# Patient Record
Sex: FEMALE | Race: WHITE | NOT HISPANIC OR LATINO | Employment: OTHER | URBAN - METROPOLITAN AREA
[De-identification: names, ages, dates, MRNs, and addresses within clinical notes are randomized per-mention and may not be internally consistent; named-entity substitution may affect disease eponyms.]

---

## 2021-02-09 ENCOUNTER — CONVERSION ENCOUNTER (OUTPATIENT)
Dept: NEUROLOGY | Facility: CLINIC | Age: 41
End: 2021-02-09

## 2021-03-23 ENCOUNTER — OFFICE VISIT CONVERTED (OUTPATIENT)
Dept: NEUROLOGY | Facility: CLINIC | Age: 41
End: 2021-03-23
Attending: PSYCHIATRY & NEUROLOGY

## 2021-03-23 ENCOUNTER — CONVERSION ENCOUNTER (OUTPATIENT)
Dept: NEUROLOGY | Facility: CLINIC | Age: 41
End: 2021-03-23

## 2021-05-14 VITALS
HEIGHT: 63 IN | HEART RATE: 66 BPM | BODY MASS INDEX: 39.75 KG/M2 | SYSTOLIC BLOOD PRESSURE: 123 MMHG | WEIGHT: 224.37 LBS | DIASTOLIC BLOOD PRESSURE: 83 MMHG

## 2021-05-14 VITALS
HEIGHT: 63 IN | DIASTOLIC BLOOD PRESSURE: 85 MMHG | BODY MASS INDEX: 38.36 KG/M2 | WEIGHT: 216.5 LBS | SYSTOLIC BLOOD PRESSURE: 149 MMHG | HEART RATE: 90 BPM

## 2021-05-14 NOTE — PROGRESS NOTES
Progress Note      Patient Name: Coco Lundberg   Patient ID: [id number]   Sex: Female   YOB: 1980    Referring Provider: Kenneth Pa    Visit Date: March 23, 2021    Provider: Adiel Khalil MD   Location: [facility]   Location Address: [address] [place] [address]  [id number]   Location Phone: [phone]          Chief Complaint     pt here for 6 week f/u       History Of Present Illness  Coco Lundberg is a 40 year old /White female who presents today to [facility] today referred from Kenneth Pa.      40-year-old woman follow-up visit for seizure-like activity.  She is now dating her spells since January.  She is here today with her son.  There is an episode in which her mouth was tight for 2 hours and she went to the emergency room and given muscle relaxers.  She is not had any episodes in the which there is staring spells or loss of consciousness.  She is not driving.  She is being followed at this time by her primary care and been given medications for anxiety and depression.  She is not driving at this time.       Past Medical History  Bipolar 1 disorder; Fibromyalgia; Seizures         Past Surgical History  Hysterectomy         Medication List  Decadron 6 mg oral tablet; gabapentin 300 mg oral capsule; Klonopin 1 mg oral tablet; omeprazole 40 mg oral capsule,delayed release(DR/EC); quetiapine 200 mg oral tablet; Vraylar 4.5 mg oral capsule         Allergy List  NO KNOWN DRUG ALLERGIES         Family Medical History  Cancer, Unspecified         Social History  Tobacco (Current every day)         Review of Systems  · Constitutional  o Denies  o : chills, excessive sweating, fatigue, fever, sycope/passing out, weight gain, weight loss  · Eyes  o Denies  o : changes in vision, blurred vision, double vision  · HENT  o Denies  o : hearing loss, ringing in the ears, ear aches, sore throat, nasal congestion, sinus pain, nose  "bleeds, seasonal allergies  · Cardiovascular  o Denies  o : blood clots, swollen legs, anemia, easy burising or bleeding, transfusions  · Respiratory  o Denies  o : shortness of breath, dry cough, productive cough, pneumonia, COPD  · Gastrointestinal  o Denies  o : dysphagia, reflux  · Genitourinary  o Denies  o : incontinence  · Neurologic  o Admits  o : headache, seizure  o Denies  o : stroke, tremor, loss of balance, falls, dizziness/vertigo, difficulty with sleep, numbness/tingling/paresthesia , difficulty with coordination, difficulty with dexterity, weakness  · Musculoskeletal  o Denies  o : neck stiffness/pain, swollen lymph nodes, muscle aches, joint pain, weakness, spasms, sciatica, pain radiating in arm, pain radiating in leg, low back pain  · Endocrine  o Denies  o : diabetes, thyroid disorder  · Psychiatric  o Admits  o : anxiety, depression      Vitals  Date Time BP Position Site L\R Cuff Size HR RR TEMP (F) WT  HT  BMI kg/m2 BSA m2 O2 Sat FR L/min FiO2 HC       03/23/2021 02:07 /83 Sitting    66 - R   224lbs 6oz 5'  3\" 39.75 2.13             Physical Examination     She is alert, fluent, phasic, follows commands well.           Assessment  · Psychogenic nonepileptic seizure     300.11/F44.5  I discussed with her and her son that she likely has nonepileptic seizures. I discussed with them that I would like for her to get a sleep deprived EEG. She does not want to be placed on antiepileptic medications at this time. I discussed with them that if she continues to have spells that are seizure-like in nature she will be placed on antiepileptic medication such as Depakote. Should she continue to have spells in spite of being on antiepileptic medication she will be referred to an epilepsy clinic. I discussed with her regarding [place] driving laws. I discussed with her that she should not be driving until 3 months have passed that she is not having any recurrent spells. They have no further questions " at this time.    Total time spent with patient and coordinating patient care was 25 minutes.      Plan  · Orders  o EEG (Sleep Deprived) [facility] ([id number]) - 300.11/F44.5 - 03/23/2021  · Medications  o Medications have been Reconciled  o Transition of Care or Provider Policy  · Instructions  o Encouraged to follow-up with Primary Care Provider for preventative care.            Electronically Signed by: Adiel Khalil MD -Author on March 23, 2021 03:07:31 PM

## 2022-09-07 ENCOUNTER — HOSPITAL ENCOUNTER (EMERGENCY)
Facility: HOSPITAL | Age: 42
Discharge: PSYCHIATRIC OR SUBSTANCE USE FACILITY | End: 2022-09-07
Attending: STUDENT IN AN ORGANIZED HEALTH CARE EDUCATION/TRAINING PROGRAM | Admitting: STUDENT IN AN ORGANIZED HEALTH CARE EDUCATION/TRAINING PROGRAM

## 2022-09-07 ENCOUNTER — HOSPITAL ENCOUNTER (INPATIENT)
Facility: HOSPITAL | Age: 42
LOS: 2 days | Discharge: HOME | End: 2022-09-09
Attending: PSYCHIATRY & NEUROLOGY | Admitting: PSYCHIATRY & NEUROLOGY

## 2022-09-07 VITALS
HEART RATE: 105 BPM | TEMPERATURE: 98.1 F | RESPIRATION RATE: 20 BRPM | WEIGHT: 203.48 LBS | DIASTOLIC BLOOD PRESSURE: 88 MMHG | HEIGHT: 63 IN | BODY MASS INDEX: 36.05 KG/M2 | OXYGEN SATURATION: 98 % | SYSTOLIC BLOOD PRESSURE: 143 MMHG

## 2022-09-07 DIAGNOSIS — F31.10 BIPOLAR I DISORDER WITH MANIA: Primary | ICD-10-CM

## 2022-09-07 PROBLEM — F29 PSYCHOSIS: Status: ACTIVE | Noted: 2022-09-07

## 2022-09-07 LAB
ALBUMIN SERPL-MCNC: 3.9 G/DL (ref 3.5–5.2)
ALBUMIN/GLOB SERPL: 1.2 G/DL
ALP SERPL-CCNC: 102 U/L (ref 39–117)
ALT SERPL W P-5'-P-CCNC: 20 U/L (ref 1–33)
AMPHET+METHAMPHET UR QL: POSITIVE
ANION GAP SERPL CALCULATED.3IONS-SCNC: 9.3 MMOL/L (ref 5–15)
APAP SERPL-MCNC: <5 MCG/ML (ref 0–30)
AST SERPL-CCNC: 15 U/L (ref 1–32)
BARBITURATES UR QL SCN: NEGATIVE
BASOPHILS # BLD AUTO: 0.07 10*3/MM3 (ref 0–0.2)
BASOPHILS NFR BLD AUTO: 0.6 % (ref 0–1.5)
BENZODIAZ UR QL SCN: NEGATIVE
BILIRUB SERPL-MCNC: 0.2 MG/DL (ref 0–1.2)
BILIRUB UR QL STRIP: NEGATIVE
BUN SERPL-MCNC: 6 MG/DL (ref 6–20)
BUN/CREAT SERPL: 8.2 (ref 7–25)
CALCIUM SPEC-SCNC: 8.9 MG/DL (ref 8.6–10.5)
CANNABINOIDS SERPL QL: NEGATIVE
CHLORIDE SERPL-SCNC: 103 MMOL/L (ref 98–107)
CLARITY UR: CLEAR
CO2 SERPL-SCNC: 26.7 MMOL/L (ref 22–29)
COCAINE UR QL: NEGATIVE
COLOR UR: YELLOW
CREAT SERPL-MCNC: 0.73 MG/DL (ref 0.57–1)
DEPRECATED RDW RBC AUTO: 48.4 FL (ref 37–54)
EGFRCR SERPLBLD CKD-EPI 2021: 106.8 ML/MIN/1.73
EOSINOPHIL # BLD AUTO: 0.32 10*3/MM3 (ref 0–0.4)
EOSINOPHIL NFR BLD AUTO: 2.7 % (ref 0.3–6.2)
ERYTHROCYTE [DISTWIDTH] IN BLOOD BY AUTOMATED COUNT: 15.7 % (ref 12.3–15.4)
ETHANOL BLD-MCNC: <10 MG/DL (ref 0–10)
ETHANOL UR QL: <0.01 %
GLOBULIN UR ELPH-MCNC: 3.2 GM/DL
GLUCOSE SERPL-MCNC: 107 MG/DL (ref 65–99)
GLUCOSE UR STRIP-MCNC: NEGATIVE MG/DL
HCT VFR BLD AUTO: 39.3 % (ref 34–46.6)
HGB BLD-MCNC: 13.1 G/DL (ref 12–15.9)
HGB UR QL STRIP.AUTO: NEGATIVE
HOLD SPECIMEN: NORMAL
HOLD SPECIMEN: NORMAL
IMM GRANULOCYTES # BLD AUTO: 0.17 10*3/MM3 (ref 0–0.05)
IMM GRANULOCYTES NFR BLD AUTO: 1.5 % (ref 0–0.5)
KETONES UR QL STRIP: NEGATIVE
LEUKOCYTE ESTERASE UR QL STRIP.AUTO: NEGATIVE
LYMPHOCYTES # BLD AUTO: 2.58 10*3/MM3 (ref 0.7–3.1)
LYMPHOCYTES NFR BLD AUTO: 22 % (ref 19.6–45.3)
MCH RBC QN AUTO: 28.5 PG (ref 26.6–33)
MCHC RBC AUTO-ENTMCNC: 33.3 G/DL (ref 31.5–35.7)
MCV RBC AUTO: 85.6 FL (ref 79–97)
METHADONE UR QL SCN: NEGATIVE
MONOCYTES # BLD AUTO: 0.7 10*3/MM3 (ref 0.1–0.9)
MONOCYTES NFR BLD AUTO: 6 % (ref 5–12)
NEUTROPHILS NFR BLD AUTO: 67.2 % (ref 42.7–76)
NEUTROPHILS NFR BLD AUTO: 7.88 10*3/MM3 (ref 1.7–7)
NITRITE UR QL STRIP: NEGATIVE
NRBC BLD AUTO-RTO: 0 /100 WBC (ref 0–0.2)
OPIATES UR QL: POSITIVE
OXYCODONE UR QL SCN: NEGATIVE
PH UR STRIP.AUTO: 7 [PH] (ref 5–8)
PLATELET # BLD AUTO: 380 10*3/MM3 (ref 140–450)
PMV BLD AUTO: 9.3 FL (ref 6–12)
POTASSIUM SERPL-SCNC: 4.2 MMOL/L (ref 3.5–5.2)
PROT SERPL-MCNC: 7.1 G/DL (ref 6–8.5)
PROT UR QL STRIP: NEGATIVE
RBC # BLD AUTO: 4.59 10*6/MM3 (ref 3.77–5.28)
SALICYLATES SERPL-MCNC: <0.3 MG/DL
SARS-COV-2 RNA RESP QL NAA+PROBE: NOT DETECTED
SODIUM SERPL-SCNC: 139 MMOL/L (ref 136–145)
SP GR UR STRIP: 1.01 (ref 1–1.03)
T4 FREE SERPL-MCNC: 0.84 NG/DL (ref 0.93–1.7)
TSH SERPL DL<=0.05 MIU/L-ACNC: 6.01 UIU/ML (ref 0.27–4.2)
UROBILINOGEN UR QL STRIP: NORMAL
WBC NRBC COR # BLD: 11.72 10*3/MM3 (ref 3.4–10.8)
WHOLE BLOOD HOLD COAG: NORMAL
WHOLE BLOOD HOLD SPECIMEN: NORMAL

## 2022-09-07 PROCEDURE — 82077 ASSAY SPEC XCP UR&BREATH IA: CPT | Performed by: STUDENT IN AN ORGANIZED HEALTH CARE EDUCATION/TRAINING PROGRAM

## 2022-09-07 PROCEDURE — 80307 DRUG TEST PRSMV CHEM ANLYZR: CPT | Performed by: STUDENT IN AN ORGANIZED HEALTH CARE EDUCATION/TRAINING PROGRAM

## 2022-09-07 PROCEDURE — 36415 COLL VENOUS BLD VENIPUNCTURE: CPT

## 2022-09-07 PROCEDURE — 80179 DRUG ASSAY SALICYLATE: CPT | Performed by: STUDENT IN AN ORGANIZED HEALTH CARE EDUCATION/TRAINING PROGRAM

## 2022-09-07 PROCEDURE — U0003 INFECTIOUS AGENT DETECTION BY NUCLEIC ACID (DNA OR RNA); SEVERE ACUTE RESPIRATORY SYNDROME CORONAVIRUS 2 (SARS-COV-2) (CORONAVIRUS DISEASE [COVID-19]), AMPLIFIED PROBE TECHNIQUE, MAKING USE OF HIGH THROUGHPUT TECHNOLOGIES AS DESCRIBED BY CMS-2020-01-R: HCPCS | Performed by: STUDENT IN AN ORGANIZED HEALTH CARE EDUCATION/TRAINING PROGRAM

## 2022-09-07 PROCEDURE — 84439 ASSAY OF FREE THYROXINE: CPT | Performed by: STUDENT IN AN ORGANIZED HEALTH CARE EDUCATION/TRAINING PROGRAM

## 2022-09-07 PROCEDURE — C9803 HOPD COVID-19 SPEC COLLECT: HCPCS

## 2022-09-07 PROCEDURE — 81003 URINALYSIS AUTO W/O SCOPE: CPT | Performed by: STUDENT IN AN ORGANIZED HEALTH CARE EDUCATION/TRAINING PROGRAM

## 2022-09-07 PROCEDURE — 80050 GENERAL HEALTH PANEL: CPT | Performed by: STUDENT IN AN ORGANIZED HEALTH CARE EDUCATION/TRAINING PROGRAM

## 2022-09-07 PROCEDURE — 99284 EMERGENCY DEPT VISIT MOD MDM: CPT

## 2022-09-07 PROCEDURE — 80143 DRUG ASSAY ACETAMINOPHEN: CPT | Performed by: STUDENT IN AN ORGANIZED HEALTH CARE EDUCATION/TRAINING PROGRAM

## 2022-09-07 RX ORDER — CLONAZEPAM 1 MG/1
1 TABLET ORAL 2 TIMES DAILY PRN
COMMUNITY
Start: 2022-06-03 | End: 2022-09-09 | Stop reason: HOSPADM

## 2022-09-07 RX ORDER — LOPERAMIDE HYDROCHLORIDE 2 MG/1
2 CAPSULE ORAL
Status: DISCONTINUED | OUTPATIENT
Start: 2022-09-07 | End: 2022-09-09 | Stop reason: HOSPADM

## 2022-09-07 RX ORDER — GABAPENTIN 600 MG/1
600 TABLET ORAL 3 TIMES DAILY
COMMUNITY
Start: 2022-05-24

## 2022-09-07 RX ORDER — DIPHENHYDRAMINE HCL 50 MG
50 CAPSULE ORAL EVERY 4 HOURS PRN
Status: DISCONTINUED | OUTPATIENT
Start: 2022-09-07 | End: 2022-09-09 | Stop reason: HOSPADM

## 2022-09-07 RX ORDER — TRAZODONE HYDROCHLORIDE 50 MG/1
50 TABLET ORAL NIGHTLY PRN
Status: DISCONTINUED | OUTPATIENT
Start: 2022-09-07 | End: 2022-09-09 | Stop reason: HOSPADM

## 2022-09-07 RX ORDER — QUETIAPINE FUMARATE 300 MG/1
300 TABLET, FILM COATED ORAL 2 TIMES DAILY
COMMUNITY
Start: 2022-09-07 | End: 2022-09-09 | Stop reason: HOSPADM

## 2022-09-07 RX ORDER — ALUMINA, MAGNESIA, AND SIMETHICONE 2400; 2400; 240 MG/30ML; MG/30ML; MG/30ML
15 SUSPENSION ORAL EVERY 6 HOURS PRN
Status: DISCONTINUED | OUTPATIENT
Start: 2022-09-07 | End: 2022-09-09 | Stop reason: HOSPADM

## 2022-09-07 RX ORDER — HALOPERIDOL 5 MG/1
5 TABLET ORAL EVERY 4 HOURS PRN
Status: DISCONTINUED | OUTPATIENT
Start: 2022-09-07 | End: 2022-09-09 | Stop reason: HOSPADM

## 2022-09-07 RX ORDER — ACETAMINOPHEN 325 MG/1
650 TABLET ORAL EVERY 6 HOURS PRN
Status: DISCONTINUED | OUTPATIENT
Start: 2022-09-07 | End: 2022-09-09 | Stop reason: HOSPADM

## 2022-09-07 RX ORDER — OMEPRAZOLE 40 MG/1
20 CAPSULE, DELAYED RELEASE ORAL EVERY MORNING
COMMUNITY
Start: 2022-08-10

## 2022-09-07 RX ORDER — FAMOTIDINE 20 MG/1
20 TABLET, FILM COATED ORAL 2 TIMES DAILY PRN
Status: DISCONTINUED | OUTPATIENT
Start: 2022-09-07 | End: 2022-09-09 | Stop reason: HOSPADM

## 2022-09-07 RX ORDER — IBUPROFEN 400 MG/1
400 TABLET ORAL EVERY 6 HOURS PRN
Status: DISCONTINUED | OUTPATIENT
Start: 2022-09-07 | End: 2022-09-09 | Stop reason: HOSPADM

## 2022-09-07 RX ORDER — HALOPERIDOL 5 MG/ML
5 INJECTION INTRAMUSCULAR EVERY 4 HOURS PRN
Status: DISCONTINUED | OUTPATIENT
Start: 2022-09-07 | End: 2022-09-09 | Stop reason: HOSPADM

## 2022-09-07 RX ORDER — ALBUTEROL SULFATE 90 UG/1
2 AEROSOL, METERED RESPIRATORY (INHALATION) EVERY 4 HOURS PRN
COMMUNITY
Start: 2022-05-24

## 2022-09-07 RX ORDER — NICOTINE 21 MG/24HR
1 PATCH, TRANSDERMAL 24 HOURS TRANSDERMAL ONCE
Status: DISCONTINUED | OUTPATIENT
Start: 2022-09-07 | End: 2022-09-07 | Stop reason: HOSPADM

## 2022-09-07 RX ORDER — FLUTICASONE PROPIONATE AND SALMETEROL XINAFOATE 115; 21 UG/1; UG/1
2 AEROSOL, METERED RESPIRATORY (INHALATION) 2 TIMES DAILY
COMMUNITY
Start: 2022-06-21

## 2022-09-07 RX ORDER — HYDROXYZINE HYDROCHLORIDE 25 MG/1
50 TABLET, FILM COATED ORAL EVERY 6 HOURS PRN
Status: DISCONTINUED | OUTPATIENT
Start: 2022-09-07 | End: 2022-09-09 | Stop reason: HOSPADM

## 2022-09-07 RX ORDER — DIPHENHYDRAMINE HYDROCHLORIDE 50 MG/ML
50 INJECTION INTRAMUSCULAR; INTRAVENOUS EVERY 4 HOURS PRN
Status: DISCONTINUED | OUTPATIENT
Start: 2022-09-07 | End: 2022-09-09 | Stop reason: HOSPADM

## 2022-09-07 RX ADMIN — NICOTINE 1 PATCH: 21 PATCH, EXTENDED RELEASE TRANSDERMAL at 22:06

## 2022-09-07 NOTE — ED NOTES
Mother has note written by doctor asking for pt to be admitted for terell and hallucinations. Pt has been off bipolar and schizophrenia medications. Mother has been staying with pt. Pt denies being suicidal to this RN but mother states pt has made suicidal comments.

## 2022-09-08 PROBLEM — M79.7 FIBROMYALGIA: Status: ACTIVE | Noted: 2021-12-08

## 2022-09-08 PROBLEM — F11.10 OPIOID ABUSE: Status: ACTIVE | Noted: 2022-09-08

## 2022-09-08 PROBLEM — F15.90 AMPHETAMINE MISUSE: Status: ACTIVE | Noted: 2022-09-08

## 2022-09-08 PROBLEM — J45.909 ASTHMA: Status: ACTIVE | Noted: 2022-09-08

## 2022-09-08 PROBLEM — F43.10 POSTTRAUMATIC STRESS DISORDER: Status: ACTIVE | Noted: 2021-12-08

## 2022-09-08 PROCEDURE — 94640 AIRWAY INHALATION TREATMENT: CPT

## 2022-09-08 RX ORDER — BUDESONIDE AND FORMOTEROL FUMARATE DIHYDRATE 80; 4.5 UG/1; UG/1
2 AEROSOL RESPIRATORY (INHALATION)
Status: DISCONTINUED | OUTPATIENT
Start: 2022-09-08 | End: 2022-09-09 | Stop reason: HOSPADM

## 2022-09-08 RX ORDER — ARFORMOTEROL TARTRATE 15 UG/2ML
15 SOLUTION RESPIRATORY (INHALATION)
Status: DISCONTINUED | OUTPATIENT
Start: 2022-09-08 | End: 2022-09-08

## 2022-09-08 RX ORDER — RISPERIDONE 2 MG/1
2 TABLET ORAL 2 TIMES DAILY
Status: DISCONTINUED | OUTPATIENT
Start: 2022-09-08 | End: 2022-09-09 | Stop reason: HOSPADM

## 2022-09-08 RX ORDER — NICOTINE 21 MG/24HR
1 PATCH, TRANSDERMAL 24 HOURS TRANSDERMAL
Status: DISCONTINUED | OUTPATIENT
Start: 2022-09-08 | End: 2022-09-09 | Stop reason: HOSPADM

## 2022-09-08 RX ORDER — ALBUTEROL SULFATE 90 UG/1
2 AEROSOL, METERED RESPIRATORY (INHALATION) EVERY 4 HOURS PRN
Status: DISCONTINUED | OUTPATIENT
Start: 2022-09-08 | End: 2022-09-09 | Stop reason: HOSPADM

## 2022-09-08 RX ORDER — BUDESONIDE 0.25 MG/2ML
0.25 INHALANT ORAL
Status: DISCONTINUED | OUTPATIENT
Start: 2022-09-08 | End: 2022-09-08

## 2022-09-08 RX ORDER — GABAPENTIN 400 MG/1
400 CAPSULE ORAL 3 TIMES DAILY
Status: DISCONTINUED | OUTPATIENT
Start: 2022-09-08 | End: 2022-09-09 | Stop reason: HOSPADM

## 2022-09-08 RX ADMIN — GABAPENTIN 400 MG: 400 CAPSULE ORAL at 15:01

## 2022-09-08 RX ADMIN — TRAZODONE HYDROCHLORIDE 50 MG: 50 TABLET ORAL at 21:25

## 2022-09-08 RX ADMIN — RISPERIDONE 2 MG: 2 TABLET ORAL at 21:22

## 2022-09-08 RX ADMIN — GABAPENTIN 400 MG: 400 CAPSULE ORAL at 21:22

## 2022-09-08 RX ADMIN — GABAPENTIN 400 MG: 400 CAPSULE ORAL at 09:40

## 2022-09-08 RX ADMIN — TRAZODONE HYDROCHLORIDE 50 MG: 50 TABLET ORAL at 00:43

## 2022-09-08 RX ADMIN — BUDESONIDE AND FORMOTEROL FUMARATE DIHYDRATE 2 PUFF: 80; 4.5 AEROSOL RESPIRATORY (INHALATION) at 11:05

## 2022-09-08 RX ADMIN — HYDROXYZINE HYDROCHLORIDE 50 MG: 25 TABLET ORAL at 00:43

## 2022-09-08 RX ADMIN — RISPERIDONE 2 MG: 2 TABLET ORAL at 09:40

## 2022-09-08 RX ADMIN — NICOTINE 1 PATCH: 21 PATCH, EXTENDED RELEASE TRANSDERMAL at 10:35
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
## 2022-09-08 NOTE — ED PROVIDER NOTES
Time: 8:16 PM EDT  Arrived by: private car  Chief Complaint: hallucinations  History provided by: Pt, mother  History is limited by: N/A     History of Present Illness:  Patient is a 41 y.o. year old female who presents to the emergency department with terell and hallucination episodes for the past few days. Per mother, Pt was stating that there were demons in her house. Mother also states that Pt expressed suicidal statements recently.  Patient admits to  that she plans to overdose on her Seroquel.  She states her next step is to write letters of her suicide.  Mother denied any past hx of SI. Pt has not been taking bipolar and schizophrenia medications for 4-5 months. Pt has a hx of breakup with boyfriend. Pt was seen in her primary care office, and was referred to ED for concern of psychosis.    Similar Symptoms Previously: no  Recently seen: no      Patient Care Team  Primary Care Provider: Kenneth Ruiz MD    Past Medical History:     No Known Allergies  Past Medical History:   Diagnosis Date   • Bipolar 1 disorder (CMS/HCC)    • Fibromyalgia    • Seizure (CMS/HCC)      Past Surgical History:   Procedure Laterality Date   • HYSTERECTOMY       Family History   Problem Relation Age of Onset   • Cancer Other         UNSPECIFIED       Home Medications:  Prior to Admission medications    Medication Sig Start Date End Date Taking? Authorizing Provider   albuterol sulfate  (90 Base) MCG/ACT inhaler Inhale 2 puffs Every 4 (Four) Hours As Needed. 5/24/22  Yes ProviderCha MD   clonazePAM (KlonoPIN) 1 MG tablet Take 1 mg by mouth 2 (Two) Times a Day As Needed. 6/3/22  Yes ProviderCha MD   fluticasone-salmeterol (Advair HFA) 115-21 MCG/ACT inhaler Inhale 2 puffs 2 (Two) Times a Day. Pt states she takes 2 puffs in morning and 2 puffs at night 6/21/22  Yes ProviderCha MD   gabapentin (NEURONTIN) 600 MG tablet Take 600 mg by mouth 3 (Three) Times a Day. 5/24/22  Yes  "ProviderCha MD   omeprazole (priLOSEC) 40 MG capsule Take 20 mg by mouth Every Morning. 8/10/22  Yes Cha Andrade MD   QUEtiapine (SEROquel) 300 MG tablet Take 300 mg by mouth 2 (Two) Times a Day. One tablet in the morning and 2 tablets at night 9/7/22  Yes Cha Andrade MD        Social History:   Social History     Tobacco Use   • Smoking status: Current Every Day Smoker     Packs/day: 1.00     Recent travel: no     Review of Systems:  Review of Systems   Constitutional: Negative for chills and fever.   HENT: Negative for congestion, rhinorrhea and sore throat.    Eyes: Negative for pain and visual disturbance.   Respiratory: Negative for apnea, cough, chest tightness and shortness of breath.    Cardiovascular: Negative for chest pain and palpitations.   Gastrointestinal: Negative for abdominal pain, diarrhea, nausea and vomiting.   Genitourinary: Negative for difficulty urinating and dysuria.   Musculoskeletal: Negative for joint swelling and myalgias.   Skin: Negative for color change.   Neurological: Negative for seizures and headaches.   Psychiatric/Behavioral: Positive for hallucinations. Negative for suicidal ideas.   All other systems reviewed and are negative.       Physical Exam:  /88 (Patient Position: Sitting)   Pulse 105   Temp 98.1 °F (36.7 °C) (Oral)   Resp 20   Ht 160 cm (63\")   Wt 92.3 kg (203 lb 7.8 oz)   SpO2 98%   BMI 36.05 kg/m²     Physical Exam  Vitals and nursing note reviewed.   Constitutional:       General: She is not in acute distress.     Appearance: Normal appearance. She is not toxic-appearing.   HENT:      Head: Normocephalic and atraumatic.      Jaw: There is normal jaw occlusion.   Eyes:      General: Lids are normal.      Extraocular Movements: Extraocular movements intact.      Conjunctiva/sclera: Conjunctivae normal.      Pupils: Pupils are equal, round, and reactive to light.   Cardiovascular:      Rate and Rhythm: Normal rate and regular " rhythm.      Pulses: Normal pulses.      Heart sounds: Normal heart sounds.   Pulmonary:      Effort: Pulmonary effort is normal. No respiratory distress.      Breath sounds: Normal breath sounds. No wheezing or rhonchi.   Abdominal:      General: Abdomen is flat.      Palpations: Abdomen is soft.      Tenderness: There is no abdominal tenderness. There is no guarding or rebound.   Musculoskeletal:         General: Normal range of motion.      Cervical back: Normal range of motion and neck supple.      Right lower leg: No edema.      Left lower leg: No edema.   Skin:     General: Skin is warm and dry.   Neurological:      Mental Status: She is alert and oriented to person, place, and time. Mental status is at baseline.   Psychiatric:         Mood and Affect: Mood is anxious. Affect is tearful.         Thought Content: Thought content includes suicidal ideation.         Judgment: Judgment is impulsive.                Medications in the Emergency Department:  Medications   nicotine (NICODERM CQ) 21 MG/24HR patch 1 patch (1 patch Transdermal Medication Applied 9/7/22 2206)        Labs  Lab Results (last 24 hours)     Procedure Component Value Units Date/Time    CBC & Differential [[id number]]  (Abnormal) Collected: 09/07/22 2011    Specimen: Blood Updated: 09/07/22 2020    Narrative:      The following orders were created for panel order CBC & Differential.  Procedure                               Abnormality         Status                     ---------                               -----------         ------                     CBC Auto Differential[[id number]]        Abnormal            Final result                 Please view results for these tests on the individual orders.    Comprehensive Metabolic Panel [[id number]]  (Abnormal) Collected: 09/07/22 2011    Specimen: Blood Updated: 09/07/22 2042     Glucose 107 mg/dL      BUN 6 mg/dL      Creatinine 0.73 mg/dL      Sodium 139 mmol/L      Potassium 4.2 mmol/L       Chloride 103 mmol/L      CO2 26.7 mmol/L      Calcium 8.9 mg/dL      Total Protein 7.1 g/dL      Albumin 3.90 g/dL      ALT (SGPT) 20 U/L      AST (SGOT) 15 U/L      Alkaline Phosphatase 102 U/L      Total Bilirubin 0.2 mg/dL      Globulin 3.2 gm/dL      A/G Ratio 1.2 g/dL      BUN/Creatinine Ratio 8.2     Anion Gap 9.3 mmol/L      eGFR 106.8 mL/min/1.73      Comment: National Kidney Foundation and American Society of Nephrology (ASN) Task Force recommended calculation based on the Chronic Kidney Disease Epidemiology Collaboration (CKD-EPI) equation refit without adjustment for race.       Narrative:      GFR Normal >60  Chronic Kidney Disease <60  Kidney Failure <15      Acetaminophen Level [[id number]]  (Normal) Collected: 09/07/22 2011    Specimen: Blood Updated: 09/07/22 2042     Acetaminophen <5.0 mcg/mL     Ethanol [[id number]] Collected: 09/07/22 2011    Specimen: Blood Updated: 09/07/22 2042     Ethanol <10 mg/dL      Ethanol % <0.010 %     Narrative:      Ethanol (Plasma)  <10 Essentially Negative    Toxic Concentrations           mg/dL    Flushing, slowing of reflexes    Impaired visual activity         Depression of CNS              >100  Possible Coma                  >300       Salicylate Level [[id number]]  (Normal) Collected: 09/07/22 2011    Specimen: Blood Updated: 09/07/22 2042     Salicylate <0.3 mg/dL     TSH [[id number]]  (Abnormal) Collected: 09/07/22 2011    Specimen: Blood Updated: 09/07/22 2048     TSH 6.010 uIU/mL     T4, Free [[id number]]  (Abnormal) Collected: 09/07/22 2011    Specimen: Blood Updated: 09/07/22 2048     Free T4 0.84 ng/dL     Narrative:      Results may be falsely increased if patient taking Biotin.      CBC Auto Differential [[id number]]  (Abnormal) Collected: 09/07/22 2011    Specimen: Blood Updated: 09/07/22 2020     WBC 11.72 10*3/mm3      RBC 4.59 10*6/mm3      Hemoglobin 13.1 g/dL      Hematocrit 39.3 %      MCV 85.6 fL      MCH 28.5 pg      MCHC 33.3 g/dL       RDW 15.7 %      RDW-SD 48.4 fl      MPV 9.3 fL      Platelets 380 10*3/mm3      Neutrophil % 67.2 %      Lymphocyte % 22.0 %      Monocyte % 6.0 %      Eosinophil % 2.7 %      Basophil % 0.6 %      Immature Grans % 1.5 %      Neutrophils, Absolute 7.88 10*3/mm3      Lymphocytes, Absolute 2.58 10*3/mm3      Monocytes, Absolute 0.70 10*3/mm3      Eosinophils, Absolute 0.32 10*3/mm3      Basophils, Absolute 0.07 10*3/mm3      Immature Grans, Absolute 0.17 10*3/mm3      nRBC 0.0 /100 WBC     COVID-19,CEPHEID/FEROZ,COR/GRISELDA/PAD/ERIC IN-HOUSE(OR EMERGENT/ADD-ON),NP SWAB IN TRANSPORT MEDIA 3-4 HR TAT, RT-PCR - Swab, Nasopharynx [[id number]]  (Normal) Collected: 09/07/22 2016    Specimen: Swab from Nasopharynx Updated: 09/07/22 2057     COVID19 Not Detected    Narrative:      Fact sheet for providers: https://www.fda.gov/media/503497/download     Fact sheet for patients: https://www.fda.gov/media/001742/download  Fact sheet for providers: https://www.fda.gov/media/212149/download     Fact sheet for patients: https://www.fda.gov/media/194058/download    Urine Drug Screen - Urine, Clean Catch [[id number]]  (Abnormal) Collected: 09/07/22 2105    Specimen: Urine, Clean Catch Updated: 09/07/22 2149     Amphet/Methamphet, Screen Positive     Barbiturates Screen, Urine Negative     Benzodiazepine Screen, Urine Negative     Cocaine Screen, Urine Negative     Opiate Screen Positive     THC, Screen, Urine Negative     Methadone Screen, Urine Negative     Oxycodone Screen, Urine Negative    Narrative:      Negative Thresholds Per Drugs Screened:    Amphetamines                 500 ng/ml  Barbiturates                 200 ng/ml  Benzodiazepines              100 ng/ml  Cocaine                      300 ng/ml  Methadone                    300 ng/ml  Opiates                      300 ng/ml  Oxycodone                    100 ng/ml  THC                           50 ng/ml    The Normal Value for all drugs tested is negative. This report includes  final unconfirmed screening results to be used for medical treatment purposes only. Unconfirmed results must not be used for non-medical purposes such as employment or legal testing. Clinical consideration should be applied to any drug of abuse test, particularly when unconfirmed results are used.            Urinalysis With Culture If Indicated - Urine, Clean Catch [[id number]]  (Normal) Collected: 09/07/22 2105    Specimen: Urine, Clean Catch Updated: 09/07/22 2115     Color, UA Yellow     Appearance, UA Clear     pH, UA 7.0     Specific Gravity, UA 1.006     Glucose, UA Negative     Ketones, UA Negative     Bilirubin, UA Negative     Blood, UA Negative     Protein, UA Negative     Leuk Esterase, UA Negative     Nitrite, UA Negative     Urobilinogen, UA 0.2 E.U./dL    Narrative:      In absence of clinical symptoms, the presence of pyuria, bacteria, and/or nitrites on the urinalysis result does not correlate with infection.  Urine microscopic not indicated.           Imaging:  No Radiology Exams Resulted Within Past 24 Hours    Procedures:  Procedures    Progress                            Medical Decision Making:  MDM  Number of Diagnoses or Management Options  Diagnosis management comments: Patient was evaluated by CSW and she voiced suicidal ideation with a plan.  She is very tearful and anxious.  She does have amphetamines and opioids in her urine.  Do feel she would benefit from an inpatient stay.  She is agreeable to stay.  Patient accepted to [facility] by .       Amount and/or Complexity of Data Reviewed  Clinical lab tests: reviewed  Tests in the radiology section of CPT®: reviewed  Obtain history from someone other than the patient: yes  Discuss the patient with other providers: yes  Independent visualization of images, tracings, or specimens: yes         Final diagnoses:   Bipolar I disorder with terell (HCC)        Disposition:  ED Disposition     ED Disposition   DC/Transfer to Behavioral  Health    Condition   Stable    Comment   --             This medical record created using voice recognition software.             Nj Pang  09/07/22 2021       Lalitha Hernandez MD  09/07/22 0236
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
## 2022-09-08 NOTE — PLAN OF CARE
Goal Outcome Evaluation:  Plan of Care Reviewed With: patient  Patient Agreement with Plan of Care: agrees        Outcome Evaluation: Coco was very pleasant throughout shift with truthful and open conversations about her addiction and her past. Patient was tearful talking about her past and her children. Patient did talk to her mother and kids multiple times throughout the day. Patient denies any SI or HI and states her thought were already better and she was ready to leave. Patient did fill out a discharge request form that was denied. Patient was understand of the MDs decision to make her stay and kept a postitive attitude. Patient did rest some today for about an hour. She rated her depression at a 7 and anxiety at a 2. Patient has not had anymore hallucinations but states the silence here is hard on her because she does a lot of thinking. Patient does have some wheezes thoughout and started on her home dose inhalers along with a nicotine patch.

## 2022-09-08 NOTE — ED NOTES
"This RN went over medication pt has with her but pt was unsure of other medications she takes. She states, \" I have a drawer full of them at home.\"   "
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
## 2022-09-09 VITALS
BODY MASS INDEX: 36.25 KG/M2 | RESPIRATION RATE: 18 BRPM | HEART RATE: 103 BPM | WEIGHT: 204.59 LBS | TEMPERATURE: 98.3 F | HEIGHT: 63 IN | OXYGEN SATURATION: 97 % | DIASTOLIC BLOOD PRESSURE: 90 MMHG | SYSTOLIC BLOOD PRESSURE: 128 MMHG

## 2022-09-09 PROBLEM — K21.9 GERD (GASTROESOPHAGEAL REFLUX DISEASE): Status: ACTIVE | Noted: 2022-09-09

## 2022-09-09 PROBLEM — F29 PSYCHOSIS (HCC): Status: RESOLVED | Noted: 2022-09-07 | Resolved: 2022-09-09

## 2022-09-09 RX ORDER — TRAZODONE HYDROCHLORIDE 100 MG/1
100 TABLET ORAL NIGHTLY PRN
Qty: 30 TABLET | Refills: 0 | Status: SHIPPED | OUTPATIENT
Start: 2022-09-09

## 2022-09-09 RX ORDER — RISPERIDONE 2 MG/1
2 TABLET ORAL 2 TIMES DAILY
Qty: 60 TABLET | Refills: 1 | Status: SHIPPED | OUTPATIENT
Start: 2022-09-09

## 2022-09-09 RX ADMIN — GABAPENTIN 400 MG: 400 CAPSULE ORAL at 08:09

## 2022-09-09 RX ADMIN — NICOTINE 1 PATCH: 21 PATCH, EXTENDED RELEASE TRANSDERMAL at 08:08

## 2022-09-09 RX ADMIN — IBUPROFEN 400 MG: 400 TABLET, FILM COATED ORAL at 07:47

## 2022-09-09 RX ADMIN — ACETAMINOPHEN 650 MG: 325 TABLET ORAL at 10:21

## 2022-09-09 RX ADMIN — RISPERIDONE 2 MG: 2 TABLET ORAL at 08:09

## 2022-09-09 NOTE — DISCHARGE SUMMARY
[facility]         DISCHARGE SUMMARY    Patient Name: Coco Lundberg  : 1980  MRN: [id number]    Date of Admission: 2022  Date of Discharge: 2022  Primary Care Physician: Kenneth Ruiz MD    Consults     No orders found from 2022 to 2022.          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 female admitted on a voluntary basis for psychosis.  Patient was noted to be very agitated and in a manic state on admission.  Did not require any medicines for acute agitation while in the hospital.    Patient rambling with push speech on a mission.  She was also not not be sleeping well was rather energetic.  She also reported racing thoughts.  Patient's toxicology screen was positive for methamphetamine which was contributing to her psychosis.  Patient also reports a history of bipolar disorder but his diagnosis is probably not accurate.  For her presentation and her psychosis on admission she was started on Risperdal 2 mg twice daily.    But initiation of Risperdal the patient reports that she almost immediately had improvement.  She reports that it made her thoughts much more clear.  She reports that she felt slowed down and her head became free of the fog she had previously felt like she was in.  She reports her mood is improved.  She reports that she slept well.  Patient on day of discharge states she is feeling really good and is significantly improved.    Patient reported her last use of methamphetamine was 2 to 3 weeks ago but her toxicology screen  "was positive for this as well as opiates.  She is been minimizing her substance use.  Discussed and alcohol rehabilitation with the patient and she defers this option.  She reports been to rehab in the past and did not particularly care for it.  However, she reports it was a long time ago that she went and she still is very reticent to attend after discussing that is probably changed significantly since she was last in any rehab.  Patient does not knowledge that she has problems with substances and that she hopes to get treatment for this with outpatient treatment in [place].  The patient is actually looking forward to going to an outpatient therapist and continuing treatment for mood disorder.    Patient's mood and affect of significant improved.  She is calm and cooperative.  She been compliant with treatment medications.  She is denied suicidal ideations throughout her stay.  Patient at this point is stable and is requesting discharge.  Stability is come from a combination of medication as well as detoxification from substances.  She did have some difficulty sleeping and required trazodone for sleep and this will be continued as an outpatient.    She reports her mood is \"really good\" on day of discharge.  Her thought processes are sequential goal directed.  There is no suicidal or homicidal ideation.  She is future oriented goal-directed.  There is no acute anxiety or agitation.  Patient appears stable at this time and can be treated as an outpatient will discharge home        DISCHARGE Follow Up Recommendations for labs and diagnostics: Drug and alcohol treatment, community mental health, primary care for routine health maintenance, lipid and glucose monitoring      Day of Discharge     Vital Signs:  Temp:  [98.3 °F (36.8 °C)] 98.3 °F (36.8 °C)  Heart Rate:  [] 103  Resp:  [18] 18  BP: (128-136)/(90) 128/90      Pertinent  and/or Most Recent Results     LAB RESULTS:      Lab 09/07/22 2011   WBC 11.72* "   HEMOGLOBIN 13.1   HEMATOCRIT 39.3   PLATELETS 380   NEUTROS ABS 7.88*   IMMATURE GRANS (ABS) 0.17*   LYMPHS ABS 2.58   MONOS ABS 0.70   EOS ABS 0.32   MCV 85.6         Lab 09/07/22 2011   SODIUM 139   POTASSIUM 4.2   CHLORIDE 103   CO2 26.7   ANION GAP 9.3   BUN 6   CREATININE 0.73   EGFR 106.8   GLUCOSE 107*   CALCIUM 8.9   TSH 6.010*         Lab 09/07/22 2011   TOTAL PROTEIN 7.1   ALBUMIN 3.90   GLOBULIN 3.2   ALT (SGPT) 20   AST (SGOT) 15   BILIRUBIN 0.2   ALK PHOS 102                                 Lab 09/07/22 2016   COVID19 Not Detected         Lab 09/07/22 2011   ETHANOL PCT <0.010   ETHANOL MGDL <10         Lab 09/07/22 2105   AMPH/METHAM SCREEN, URINE Positive*   BENZODIAZEPINE SCREEN, URINE Negative   COCAINE SCREEN, URINE Negative   OPIATES Positive*   THC URINE SCREEN Negative   METHADONE SCREEN, URINE Negative     Brief Urine Lab Results  (Last result in the past 365 days)      Color   Clarity   Blood   Leuk Est   Nitrite   Protein   CREAT   Urine HCG        09/07/22 2105 Yellow   Clear   Negative   Negative   Negative   Negative                                       Imaging Results (Last 7 Days)     ** No results found for the last 168 hours. **           Labs Pending at Discharge:           Discharge Details        Discharge Medications      New Medications      Instructions Start Date   risperiDONE 2 MG tablet  Commonly known as: risperDAL   2 mg, Oral, 2 Times Daily      traZODone 100 MG tablet  Commonly known as: DESYREL   100 mg, Oral, Nightly PRN         Continue These Medications      Instructions Start Date   Advair -21 MCG/ACT inhaler  Generic drug: fluticasone-salmeterol   2 puffs, Inhalation, 2 Times Daily, Pt states she takes 2 puffs in morning and 2 puffs at night       albuterol sulfate  (90 Base) MCG/ACT inhaler  Commonly known as: PROVENTIL HFA;VENTOLIN HFA;PROAIR HFA   2 puffs, Inhalation, Every 4 Hours PRN      gabapentin 600 MG tablet  Commonly known as:  NEURONTIN   600 mg, Oral, 3 Times Daily      omeprazole 40 MG capsule  Commonly known as: priLOSEC   20 mg, Oral, Every Morning         Stop These Medications    clonazePAM 1 MG tablet  Commonly known as: KlonoPIN     QUEtiapine 300 MG tablet  Commonly known as: SEROquel            No Known Allergies      Discharge Disposition:  Home or Self Care    Diet:  Hospital:  Diet Order   Procedures   • Diet Regular         Discharge Activity:   Activity Instructions     Activity as Tolerated            Discharge Condition: Stable    CODE STATUS:  Code Status and Medical Interventions:   Ordered at: 09/08/22 0916     Code Status (Patient has no pulse and is not breathing):    CPR (Attempt to Resuscitate)     Medical Interventions (Patient has pulse or is breathing):    Full Support         No future appointments.    Additional Instructions for the Follow-ups that You Need to Schedule     Discharge Follow-up with PCP   As directed       Currently Documented PCP:    Kenneth Ruiz MD    PCP Phone Number:    [phone]     Follow Up Details: As scheduled         Discharge Follow-up with Specified Provider: [facility]   As directed      To: [facility]         Discharge Follow-up with Specified Provider: Drug and alcohol rehabilitation   As directed      To: Drug and alcohol rehabilitation               Time spent on Discharge including face to face service: 40 minutes    Part of this note may be an electronic transcription/translation of spoken language to printed text using the Dragon dictation system.  Every attempt has been made to correct errors but some transcription errors may be present in the body of the note.    Electronically signed by Gregory Magana MD, 09/09/22, 11:34 AM EDT.

## 2022-09-09 NOTE — PLAN OF CARE
"Goal Outcome Evaluation:  Plan of Care Reviewed With: patient  Patient Agreement with Plan of Care: agrees     Progress: improving  Patient states she is feeling much better and improved after receiving Risperidone yesterday afternoon, reporting that it has \"cleared my head and my thoughts and I'm no longer seeing those awful things.\" Patient is pleasant and engaging, denies A/V hallucinations and rates depression and hopelessness at 0/10, and anxiety at 2/10.  Patient stated that so many hurtful, stressful, and depression things have recently happened to her that she just \"broke\" under the stress of it all. Patient denies S/I, stating that she was feeling that way due to the voices urging her to, but that she would never want to put her three sons through that because they would blame themselves.  Patient denies H/I, and reports she is coping better and medication is helping.  Patient's goal is to bet better sleep tonight.  Safe environment provided.      "

## 2022-09-09 NOTE — PLAN OF CARE
Goal Outcome Evaluation:  Plan of Care Reviewed With: patient  Patient Agreement with Plan of Care: agrees         Pt has been withdrawn to room. She is calm and cooperative.  She denies SI/HI, AVH and contracts for safety.  She rates anxiety 1.  She is motivated for discharge and has completed safety plan.  She is able to make her needs known.  Will continue to monitor.

## 2022-09-11 NOTE — PAYOR COMM NOTE
"Gracie Lundberg (41 y.o. Female)             Date of Birth   1980    Social Security Number       Address   [address] [address]    Home Phone   [phone]    MRN   [id number]       Sikhism   Islam    Marital Status                               Admission Date   9/7/22    Admission Type   Urgent    Admitting Provider   Kenia Pineda MD    Attending Provider       Department, Room/Bed   [facility], 272/1       Discharge Date   9/9/2022    Discharge Disposition   Home or Self Care    Discharge Destination                               Attending Provider: (none)   Allergies: No Known Allergies    Isolation: None   Infection: None   Code Status: Prior   Advance Care Planning Activity    Ht: 160 cm (63\")   Wt: 92.8 kg (204 lb 9.4 oz)    Admission Cmt: None   Principal Problem: None                Active Insurance as of 9/7/2022     Primary Coverage     Payor Plan Insurance Group Employer/Plan Group    [employer] [place] [employer] [place]      Payor Plan Address Payor Plan Phone Number Payor Plan Fax Number Effective Dates    [address] [address] [phone]  9/7/2022 - None Entered    [facility] [address]       Subscriber Name Subscriber Birth Date Member ID       GRACIE LUNDBERG 1980 [id number]                 Emergency Contacts      (Rel.) Home Phone Work Phone Mobile Phone    LIANE SAMANIEGO (Mother) -- -- [phone]      Auth/days pending  Discharged     CONTACT   [facility]    [address] [place] [address]  TAX ID [id number]  NP  [phone]                +++for Tamar                                               Ph [phone]  PH   [phone]              Fax [phone]  [phone]    Concepción Andrade, RN    Registered Nurse      Plan of Care       Signed   Date of Service:  09/09/22 1127   Creation Time:  09/09/22 1127              Signed              Show:Clear " "all  [x]Manual[x]Template[]Copied    Added by:  [x]Concepción Andrade RN      []July for details    Goal Outcome Evaluation:  Plan of Care Reviewed With: patient  Patient Agreement with Plan of Care: agrees   Pt has been withdrawn to room. She is calm and cooperative.  She denies SI/HI, AVH and contracts for safety.  She rates anxiety 1.  She is motivated for discharge and has completed safety plan.  She is able to make her needs known.  Will continue to monitor.                   Mary Ann Carbajal, RN    Registered Nurse   Psychiatry   Plan of Care       Signed   Date of Service:  09/09/22 0348   Creation Time:  09/09/22 0348              Signed              Show:Clear all  [x]Manual[x]Template[]Copied    Added by:  [x]Mary Ann Carbajal, MELODY      []July for details    Goal Outcome Evaluation:  Plan of Care Reviewed With: patient  Patient Agreement with Plan of Care: agrees  Progress: improving  Patient states she is feeling much better and improved after receiving Risperidone yesterday afternoon, reporting that it has \"cleared my head and my thoughts and I'm no longer seeing those awful things.\" Patient is pleasant and engaging, denies A/V hallucinations and rates depression and hopelessness at 0/10, and anxiety at 2/10.  Patient stated that so many hurtful, stressful, and depression things have recently happened to her that she just \"broke\" under the stress of it all. Patient denies S/I, stating that she was feeling that way due to the voices urging her to, but that she would never want to put her three sons through that because they would blame themselves.  Patient denies H/I, and reports she is coping better and medication is helping.  Patient's goal is to bet better sleep tonight.  Safe environment provided.                         Nicole Mejia, RN    Registered Nurse      Plan of Care       Signed   Date of Service:  09/08/22 1705   Creation Time:  09/08/22 1705              Signed              Show:Clear " all  []Manual[x]Template[]Copied    Added by:  [x]Nicole Mejia RN      []July for details    Goal Outcome Evaluation:  Plan of Care Reviewed With: patient  Patient Agreement with Plan of Care: agrees  Outcome Evaluation: Coco was very pleasant throughout shift with truthful and open conversations about her addiction and her past. Patient was tearful talking about her past and her children. Patient did talk to her mother and kids multiple times throughout the day. Patient denies any SI or HI and states her thought were already better and she was ready to leave. Patient did fill out a discharge request form that was denied. Patient was understand of the MDs decision to make her stay and kept a postitive attitude. Patient did rest some today for about an hour. She rated her depression at a 7 and anxiety at a 2. Patient has not had anymore hallucinations but states the silence here is hard on her because she does a lot of thinking. Patient does have some wheezes thoughout and started on her home dose inhalers along with a nicotine patch.                         Discharge Summary      Gregory Magana MD at 22 1134                         [facility]         DISCHARGE SUMMARY    Patient Name: Coco Lundberg  : 1980  MRN: [id number]    Date of Admission: 2022  Date of Discharge: 2022  Primary Care Physician: Kenneth Ruiz MD    Consults     No orders found from 2022 to 2022.          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 female admitted on a  voluntary basis for psychosis.  Patient was noted to be very agitated and in a manic state on admission.  Did not require any medicines for acute agitation while in the hospital.    Patient rambling with push speech on a mission.  She was also not not be sleeping well was rather energetic.  She also reported racing thoughts.  Patient's toxicology screen was positive for methamphetamine which was contributing to her psychosis.  Patient also reports a history of bipolar disorder but his diagnosis is probably not accurate.  For her presentation and her psychosis on admission she was started on Risperdal 2 mg twice daily.    But initiation of Risperdal the patient reports that she almost immediately had improvement.  She reports that it made her thoughts much more clear.  She reports that she felt slowed down and her head became free of the fog she had previously felt like she was in.  She reports her mood is improved.  She reports that she slept well.  Patient on day of discharge states she is feeling really good and is significantly improved.    Patient reported her last use of methamphetamine was 2 to 3 weeks ago but her toxicology screen was positive for this as well as opiates.  She is been minimizing her substance use.  Discussed and alcohol rehabilitation with the patient and she defers this option.  She reports been to rehab in the past and did not particularly care for it.  However, she reports it was a long time ago that she went and she still is very reticent to attend after discussing that is probably changed significantly since she was last in any rehab.  Patient does not knowledge that she has problems with substances and that she hopes to get treatment for this with outpatient treatment in [place].  The patient is actually looking forward to going to an outpatient therapist and continuing treatment for mood disorder.    Patient's mood and affect of significant improved.  She is calm and cooperative.   "She been compliant with treatment medications.  She is denied suicidal ideations throughout her stay.  Patient at this point is stable and is requesting discharge.  Stability is come from a combination of medication as well as detoxification from substances.  She did have some difficulty sleeping and required trazodone for sleep and this will be continued as an outpatient.    She reports her mood is \"really good\" on day of discharge.  Her thought processes are sequential goal directed.  There is no suicidal or homicidal ideation.  She is future oriented goal-directed.  There is no acute anxiety or agitation.  Patient appears stable at this time and can be treated as an outpatient will discharge home        DISCHARGE Follow Up Recommendations for labs and diagnostics: Drug and alcohol treatment, community mental health, primary care for routine health maintenance, lipid and glucose monitoring      Day of Discharge     Vital Signs:  Temp:  [98.3 °F (36.8 °C)] 98.3 °F (36.8 °C)  Heart Rate:  [] 103  Resp:  [18] 18  BP: (128-136)/(90) 128/90      Pertinent  and/or Most Recent Results     LAB RESULTS:      Lab 09/07/22 2011   WBC 11.72*   HEMOGLOBIN 13.1   HEMATOCRIT 39.3   PLATELETS 380   NEUTROS ABS 7.88*   IMMATURE GRANS (ABS) 0.17*   LYMPHS ABS 2.58   MONOS ABS 0.70   EOS ABS 0.32   MCV 85.6         Lab 09/07/22 2011   SODIUM 139   POTASSIUM 4.2   CHLORIDE 103   CO2 26.7   ANION GAP 9.3   BUN 6   CREATININE 0.73   EGFR 106.8   GLUCOSE 107*   CALCIUM 8.9   TSH 6.010*         Lab 09/07/22 2011   TOTAL PROTEIN 7.1   ALBUMIN 3.90   GLOBULIN 3.2   ALT (SGPT) 20   AST (SGOT) 15   BILIRUBIN 0.2   ALK PHOS 102                                 Lab 09/07/22 2016   COVID19 Not Detected         Lab 09/07/22 2011   ETHANOL PCT <0.010   ETHANOL MGDL <10         Lab 09/07/22  2105   AMPH/METHAM SCREEN, URINE Positive*   BENZODIAZEPINE SCREEN, URINE Negative   COCAINE SCREEN, URINE Negative   OPIATES Positive*   THC URINE " SCREEN Negative   METHADONE SCREEN, URINE Negative     Brief Urine Lab Results  (Last result in the past 365 days)      Color   Clarity   Blood   Leuk Est   Nitrite   Protein   CREAT   Urine HCG        09/07/22 2105 Yellow   Clear   Negative   Negative   Negative   Negative                                       Imaging Results (Last 7 Days)     ** No results found for the last 168 hours. **           Labs Pending at Discharge:           Discharge Details        Discharge Medications      New Medications      Instructions Start Date   risperiDONE 2 MG tablet  Commonly known as: risperDAL   2 mg, Oral, 2 Times Daily      traZODone 100 MG tablet  Commonly known as: DESYREL   100 mg, Oral, Nightly PRN         Continue These Medications      Instructions Start Date   Advair -21 MCG/ACT inhaler  Generic drug: fluticasone-salmeterol   2 puffs, Inhalation, 2 Times Daily, Pt states she takes 2 puffs in morning and 2 puffs at night       albuterol sulfate  (90 Base) MCG/ACT inhaler  Commonly known as: PROVENTIL HFA;VENTOLIN HFA;PROAIR HFA   2 puffs, Inhalation, Every 4 Hours PRN      gabapentin 600 MG tablet  Commonly known as: NEURONTIN   600 mg, Oral, 3 Times Daily      omeprazole 40 MG capsule  Commonly known as: priLOSEC   20 mg, Oral, Every Morning         Stop These Medications    clonazePAM 1 MG tablet  Commonly known as: KlonoPIN     QUEtiapine 300 MG tablet  Commonly known as: SEROquel            No Known Allergies      Discharge Disposition:  Home or Self Care    Diet:  Hospital:  Diet Order   Procedures   • Diet Regular         Discharge Activity:   Activity Instructions     Activity as Tolerated            Discharge Condition: Stable    CODE STATUS:  Code Status and Medical Interventions:   Ordered at: 09/08/22 0916     Code Status (Patient has no pulse and is not breathing):    CPR (Attempt to Resuscitate)     Medical Interventions (Patient has pulse or is breathing):    Full Support         No  future appointments.    Additional Instructions for the Follow-ups that You Need to Schedule     Discharge Follow-up with PCP   As directed       Currently Documented PCP:    Kenneth Ruiz MD    PCP Phone Number:    [phone]     Follow Up Details: As scheduled         Discharge Follow-up with Specified Provider: [facility]   As directed      To: [facility]         Discharge Follow-up with Specified Provider: Drug and alcohol rehabilitation   As directed      To: Drug and alcohol rehabilitation               Time spent on Discharge including face to face service: 40 minutes    Part of this note may be an electronic transcription/translation of spoken language to printed text using the Dragon dictation system.  Every attempt has been made to correct errors but some transcription errors may be present in the body of the note.    Electronically signed by Gregory Magana MD, 09/09/22, 11:34 AM EDT.          Electronically signed by Gregory Magana MD at 09/09/22 3337